# Patient Record
Sex: MALE | Race: WHITE | Employment: OTHER | URBAN - METROPOLITAN AREA
[De-identification: names, ages, dates, MRNs, and addresses within clinical notes are randomized per-mention and may not be internally consistent; named-entity substitution may affect disease eponyms.]

---

## 2017-02-09 ENCOUNTER — OFFICE VISIT (OUTPATIENT)
Dept: INTERNAL MEDICINE | Facility: CLINIC | Age: 42
End: 2017-02-09
Payer: COMMERCIAL

## 2017-02-09 VITALS
BODY MASS INDEX: 29.03 KG/M2 | HEART RATE: 89 BPM | OXYGEN SATURATION: 97 % | TEMPERATURE: 98.2 F | DIASTOLIC BLOOD PRESSURE: 96 MMHG | SYSTOLIC BLOOD PRESSURE: 136 MMHG | HEIGHT: 67 IN | WEIGHT: 185 LBS

## 2017-02-09 DIAGNOSIS — G93.31 POST VIRAL SYNDROME: Primary | ICD-10-CM

## 2017-02-09 DIAGNOSIS — R51.9 HEADACHE DUE TO VIRAL INFECTION: ICD-10-CM

## 2017-02-09 DIAGNOSIS — B34.9 HEADACHE DUE TO VIRAL INFECTION: ICD-10-CM

## 2017-02-09 PROCEDURE — 99214 OFFICE O/P EST MOD 30 MIN: CPT | Performed by: INTERNAL MEDICINE

## 2017-02-09 RX ORDER — PREDNISONE 20 MG/1
TABLET ORAL
Qty: 20 TABLET | Refills: 0 | Status: SHIPPED | OUTPATIENT
Start: 2017-02-09 | End: 2017-02-21

## 2017-02-09 NOTE — MR AVS SNAPSHOT
After Visit Summary   2/9/2017    Spencer Sahni    MRN: [id number]           Patient Information     Date Of Birth          1975        Visit Information        Provider Department      2/9/2017 11:00 AM Stanley Hinkle MD [facility]        Today's Diagnoses     Post viral syndrome    -  1     Headache due to viral infection           Care Instructions    *  Most likely post viral headaches.     *  Steroids to help reduce inflammation of the nerve and nerve tissues.     --Prednisone  3 tablets (60 mg) per day for 3 days, then 2 tablet (40 mg) per day for 3 days, 1 tablet (20 mg) per day for 3 days, then 1/2 tablet (10 mg) per day for 3 days, then stop.    *  Do not take any NSAIDS (Advil, Motrin, Aleve, Ibuprofen, or prescription versions of these medications) when taking prednisone    --Tylenol is OK.     *  Let us know if any changes in condition or any worsening.                Follow-ups after your visit        Who to contact     If you have questions or need follow up information about today's clinic visit or your schedule please contact [facility] directly at [phone].  Normal or non-critical lab and imaging results will be communicated to you by MyChart, letter or phone within 4 business days after the clinic has received the results. If you do not hear from us within 7 days, please contact the clinic through [employer] or phone. If you have a critical or abnormal lab result, we will notify you by phone as soon as possible.  Submit refill requests through [employer] or call your pharmacy and they will forward the refill request to us. Please allow 3 business days for your refill to be completed.          Additional Information About Your Visit        MyChart Information     [employer] lets you send messages to your doctor, view your test results, renew your prescriptions, schedule appointments and more. To sign up, go to  [email or web address] . Click on \"Log in\" on the left side of the screen, which will take you to the Welcome page. Then click on \"Sign up Now\" on the right side of the page.     You will be asked to enter the access code listed below, as well as some personal information. Please follow the directions to create your username and password.     Your access code is: [email or web address]  Expires: 2017 11:51 AM     Your access code will  in 90 days. If you need help or a new code, please call your [facility] clinic or [phone].        Care EveryWhere ID     This is your Care EveryWhere ID. This could be used by other organizations to access your [facility] medical records  [id number]        Your Vitals Were     Pulse Temperature Height BMI (Body Mass Index) Pulse Oximetry       89 98.2  F (36.8  C) (Oral) 5' 6.5\" (1.689 m) 29.42 kg/m2 97%        Blood Pressure from Last 3 Encounters:   17 136/96   16 130/78   16 118/88    Weight from Last 3 Encounters:   17 185 lb (83.915 kg)   16 173 lb (78.472 kg)   16 185 lb (83.915 kg)              Today, you had the following     No orders found for display         Today's Medication Changes          These changes are accurate as of: 17 12:12 PM.  If you have any questions, ask your nurse or doctor.               Start taking these medicines.        Dose/Directions    predniSONE 20 MG tablet   Commonly known as:  DELTASONE   Used for:  Post viral syndrome, Headache due to viral infection   Started by:  Stanley Hinkle MD        3 tablets per day for 3 days, then 2 tablet per day for 3 days, 1 tablet per day for 3 days, then 1/2 tablet per day for 3 days, then stop   Quantity:  20 tablet   Refills:  0            Where to get your medicines      These medications were sent to [place] [address] [place] [facility] [address] [facility]  [address] [facility] [address]     Phone:  [phone]    - predniSONE 20 MG tablet    "          Primary Care Provider Office Phone # Fax #    Chauncey Schmidt [phone]       [facility] [address] [place] [address]        Thank you!     Thank you for choosing [facility]  for your care. Our goal is always to provide you with excellent care. Hearing back from our patients is one way we can continue to improve our services. Please take a few minutes to complete the written survey that you may receive in the mail after your visit with us. Thank you!             Your Updated Medication List - Protect others around you: Learn how to safely use, store and throw away your medicines at www.disposemymeds.org.          This list is accurate as of: 2/9/17 12:12 PM.  Always use your most recent med list.                   Brand Name Dispense Instructions for use    omeprazole 20 MG CR capsule    priLOSEC    30 capsule    Take 1 capsule (20 mg) by mouth daily       predniSONE 20 MG tablet    DELTASONE    20 tablet    3 tablets per day for 3 days, then 2 tablet per day for 3 days, 1 tablet per day for 3 days, then 1/2 tablet per day for 3 days, then stop

## 2017-02-09 NOTE — PROGRESS NOTES
SUBJECTIVE:                                                    Spencer Sahni is a 41 year old male who presents to clinic today for the following health issues:    Headache     Onset: 9 days ago     Description:   Location: above L temple, under L eye and also in back of head and neck area L   Character: dull pain, constant 2-6/10  Frequency:  intermittent all day  Duration: 3-10 Minutes, but always there, mild    Intensity: mild, moderate    Progression of Symptoms:  intermittent    Accompanying Signs & Symptoms:  Stiff neck: YES  Neck or upper back pain: YES- in neck  Fever: 2 weeks ago today, cold/flu sx  Sinus pressure: YES- under L eye and in L ear  Nausea or vomiting: no  Dizziness: YES  Numbness: no  Weakness: no  Visual changes: no   History:   Head trauma: no  Family history of migraines: YES- pt does  Previous tests for headaches: no  Neurologist evaluations: no  Able to do daily activities: Yes  Wake with a headaches: YES - sometimes  Do headaches wake you up: YES- at times  Daily pain medication use: no  Work/school stressors/changes: no    Precipitating factors:   Does light make it worse: no  Does sound make it worse: no    Alleviating factors:  Does sleep help: YES- possibly         Therapies Tried and outcome: Ibuprofen (Advil, Motrin) and narcotics ( vicoden ) w/ little relief          Problem list and histories reviewed & adjusted, as indicated.  Additional history: as documented      Past Medical History:  ---------------------------  Past Medical History   Diagnosis Date     Anxiety      CARDIOVASCULAR SCREENING; LDL GOAL LESS THAN 160      Glaucoma        Past Surgical History:  ---------------------------  Past Surgical History   Procedure Laterality Date     Arthroscopy knee with meniscal repair       Right Knee       Current Medications:  ---------------------------  Current Outpatient Prescriptions   Medication Sig Dispense Refill     omeprazole (PRILOSEC) 20 MG capsule Take 1 capsule  "(20 mg) by mouth daily 30 capsule 11       Allergies:  -------------  Allergies   Allergen Reactions     No Known Drug Allergy        Social History:  -------------------  Social History     Social History     Marital Status: Single     Spouse Name: N/A     Number of Children: N/A     Years of Education: N/A     Occupational History     [employer] Self     [employer]     Social History Main Topics     Smoking status: Former Smoker -- 0.50 packs/day for 15 years     Quit date: 2010     Smokeless tobacco: Never Used     Alcohol Use: 16.8 - 25.2 oz/week     28-42 Standard drinks or equivalent per week      Comment: occ     Drug Use: No     Sexual Activity:     Partners: Female     Other Topics Concern     Parent/Sibling W/ Cabg, Mi Or Angioplasty Before 65f 55m? Yes     Social History Narrative       Family Medical History:  ------------------------------  Family History   Problem Relation Age of Onset     C.A.D. Father 43     MI age 43, +tob,  age 63     C.A.D. Paternal Grandmother      Bipolar Disorder Mother      Bipolar Disorder Maternal Uncle          ROS:  REVIEW OF SYSTEMS:    RESP: negative for cough, dyspnea, wheezing, hemoptysis  CV: negative for chest pain, palpitations, PND, MONAE, orthopnea; reports no changes in their ability to perform physical activity (from cardiovascular standpoint)  GI: negative for dysphagia, N/V, pain, melena, diarrhea and constipation  NEURO: negative for numbness/tingling, paralysis, incoordination, or focal weakness     OBJECTIVE:                                                    /96 mmHg  Pulse 89  Temp(Src) 98.2  F (36.8  C) (Oral)  Ht 5' 6.5\" (1.689 m)  Wt 185 lb (83.915 kg)  BMI 29.42 kg/m2  SpO2 97%     GENERAL alert and no distress.  EYES conjunctivae/corneas clear. PERRL, EOM's intact  HENT: NCAT,oral and posterior pharynx without lesions or erythema, facies symmetric  NECK: Neck supple. No LAD, without thyroidmegaly or JVD.  Normal " head and neck movements, negative brudzinskis sign, negative koenigs moves.   RESP: Clear to ausculation bilaterally without wheezes or crackles. Normal BS in all fields.  CV: RRR normal S1S2 without  murmurs, rubs or gallops. PMI normal  LYMPH: no cervical lymph adenopathy appreciated  GI: NTND, no organomegaly, normal BS in all quadrants, without rebound or guarding  MS: No cyanosis, clubbing or edema noted bilaterally in Upper and/or Lower Extremities  SKIN: no significant ulcers, lesions or rashes on the visualized portions of the skin  NEURO: Alert and Oriented x 3, Gait normal. Reflexes normal and symmetric. Sensation grossly WNL..  PSYCH: Alert and oriented times 3; speech- coherent , normal rate and volume; able to articulate logical thoughts, able to abstract reason, no tangential thoughts, no hallucinations or delusions, affect- normal          ASSESSMENT/PLAN:                                                      (G93.3) Post viral syndrome  (primary encounter diagnosis)  Comment:   Plan: predniSONE (DELTASONE) 20 MG tablet            (B34.9,  R51) Headache due to viral infection  Comment:   Plan: predniSONE (DELTASONE) 20 MG tablet            *  Most likely post viral headaches.     *  Steroids to help reduce inflammation of the nerve and nerve tissues.     --Prednisone  3 tablets (60 mg) per day for 3 days, then 2 tablet (40 mg) per day for 3 days, 1 tablet (20 mg) per day for 3 days, then 1/2 tablet (10 mg) per day for 3 days, then stop.    *  Do not take any NSAIDS (Advil, Motrin, Aleve, Ibuprofen, or prescription versions of these medications) when taking prednisone    --Tylenol is OK.     *  Let us know if any changes in condition or any worsening.                 See Patient Instructions    RAMIN JIMENEZ M.D., MD  [facility]

## 2017-02-09 NOTE — NURSING NOTE
"Chief Complaint   Patient presents with     Headache     X 9 days, intermittant       Initial /96 mmHg  Pulse 89  Temp(Src) 98.2  F (36.8  C) (Oral)  Ht 5' 6.5\" (1.689 m)  Wt 185 lb (83.915 kg)  BMI 29.42 kg/m2  SpO2 97% Estimated body mass index is 29.42 kg/(m^2) as calculated from the following:    Height as of this encounter: 5' 6.5\" (1.689 m).    Weight as of this encounter: 185 lb (83.915 kg).  Medication Reconciliation: complete   Umm Watosn CMA      "

## 2017-04-11 ENCOUNTER — TELEPHONE (OUTPATIENT)
Dept: INTERNAL MEDICINE | Facility: CLINIC | Age: 42
End: 2017-04-11

## 2017-04-11 NOTE — TELEPHONE ENCOUNTER
Prior authorization    Medication name omeprazole  Insurance medica  Insurance ID number [id number]  Prior authorization faxed through cover  meds

## 2017-07-07 ENCOUNTER — TRANSFERRED RECORDS (OUTPATIENT)
Dept: HEALTH INFORMATION MANAGEMENT | Facility: CLINIC | Age: 42
End: 2017-07-07

## 2017-07-20 ENCOUNTER — TRANSFERRED RECORDS (OUTPATIENT)
Dept: HEALTH INFORMATION MANAGEMENT | Facility: CLINIC | Age: 42
End: 2017-07-20

## 2017-11-01 DIAGNOSIS — K22.4 ESOPHAGEAL SPASM: ICD-10-CM

## 2017-11-01 DIAGNOSIS — K21.9 GASTROESOPHAGEAL REFLUX DISEASE, ESOPHAGITIS PRESENCE NOT SPECIFIED: ICD-10-CM

## 2018-02-22 DIAGNOSIS — K21.9 GASTROESOPHAGEAL REFLUX DISEASE, ESOPHAGITIS PRESENCE NOT SPECIFIED: ICD-10-CM

## 2018-02-22 DIAGNOSIS — K22.4 ESOPHAGEAL SPASM: ICD-10-CM

## 2018-02-22 NOTE — TELEPHONE ENCOUNTER
"Requested Prescriptions   Pending Prescriptions Disp Refills     omeprazole (PRILOSEC) 20 MG CR capsule [Pharmacy Med Name: OMEPRAZOLE 20MG CAPSULES]  Last Written Prescription Date:  11/01/2017  Last Fill Quantity: 30,  # refills: 3   Last office visit: 2/9/2017 with prescribing provider:  2/09/2017   Future Office Visit:     30 capsule 0     Sig: TAKE 1 CAPSULE BY MOUTH EVERY DAY    PPI Protocol Failed    2/22/2018  1:32 PM       Failed - Recent or future visit with authorizing provider's specialty    Patient had office visit in the last year or has a visit in the next 30 days with authorizing provider.  See \"Patient Info\" tab in inbasket, or \"Choose Columns\" in Meds & Orders section of the refill encounter.            Passed - Not on Clopidogrel (unless Pantoprazole ordered)       Passed - No diagnosis of osteoporosis on record       Passed - Patient is age 18 or older          "

## 2018-02-22 NOTE — TELEPHONE ENCOUNTER
Medication is being filled for 1 time refill only due to:  Patient needs to be seen because it has been more than one year since last visit.  Letter sent.

## 2018-02-22 NOTE — LETTER
[facility]  [address] [id number] [phone]            Spencer Sahni  [address]  [facility] [address]        February 22, 2018    Dear Spencer,    While refilling your prescription today, we noticed that you are due for an appointment with your provider.  We will refill your prescription for 30 days, but a follow-up appointment must be made before any additional refills can be approved.     Taking care of your health is important to us and we look forward to seeing you in the near future.  Please call us at [phone] or [phone] (or use [employer]) to schedule an appointment.     Please disregard this notice if you have already made an appointment.    Sincerely,        [facility]

## 2018-04-24 DIAGNOSIS — K22.4 ESOPHAGEAL SPASM: ICD-10-CM

## 2018-04-24 DIAGNOSIS — K21.9 GASTROESOPHAGEAL REFLUX DISEASE, ESOPHAGITIS PRESENCE NOT SPECIFIED: ICD-10-CM

## 2018-04-25 NOTE — TELEPHONE ENCOUNTER
"Requested Prescriptions   Pending Prescriptions Disp Refills     omeprazole (PRILOSEC) 20 MG CR capsule [Pharmacy Med Name: OMEPRAZOLE 20MG CAPSULES] 30 capsule 0     Sig: TAKE ONE CAPSULE BY MOUTH DAILY    PPI Protocol Failed    4/24/2018  3:39 PM       Failed - Recent (12 mo) or future (30 days) visit within the authorizing provider's specialty    Patient had office visit in the last 12 months or has a visit in the next 30 days with authorizing provider or within the authorizing provider's specialty.  See \"Patient Info\" tab in inbasket, or \"Choose Columns\" in Meds & Orders section of the refill encounter.           Passed - Not on Clopidogrel (unless Pantoprazole ordered)       Passed - No diagnosis of osteoporosis on record       Passed - Patient is age 18 or older        Routing refill request to provider for review/approval because:  María Elena given x1 and patient did not follow up, please advise  Patient needs to be seen because it has been more than 1 year since last office visit.        "

## 2018-05-24 DIAGNOSIS — K22.4 ESOPHAGEAL SPASM: ICD-10-CM

## 2018-05-24 DIAGNOSIS — K21.9 GASTROESOPHAGEAL REFLUX DISEASE, ESOPHAGITIS PRESENCE NOT SPECIFIED: ICD-10-CM

## 2018-05-24 NOTE — TELEPHONE ENCOUNTER
"Requested Prescriptions   Pending Prescriptions Disp Refills     omeprazole (PRILOSEC) 20 MG CR capsule [Pharmacy Med Name: OMEPRAZOLE 20MG CAPSULES] 30 capsule 0     Sig: TAKE 1 CAPSULE BY MOUTH DAILY    PPI Protocol Failed    5/24/2018  9:40 AM       Failed - Recent (12 mo) or future (30 days) visit within the authorizing provider's specialty    Patient had office visit in the last 12 months or has a visit in the next 30 days with authorizing provider or within the authorizing provider's specialty.  See \"Patient Info\" tab in inbasket, or \"Choose Columns\" in Meds & Orders section of the refill encounter.           Passed - Not on Clopidogrel (unless Pantoprazole ordered)       Passed - No diagnosis of osteoporosis on record       Passed - Patient is age 18 or older        Routing refill request to provider for review/approval because:  María Elena given x1 and patient did not follow up, please advise  Patient needs to be seen because it has been more than 1 year since last office visit.        "

## 2018-05-25 NOTE — TELEPHONE ENCOUNTER
Called and left message with [facility] with information below.  Sierra Anders, SHARON on 5/25/2018 at 8:25 AM

## 2018-05-25 NOTE — TELEPHONE ENCOUNTER
Pt last seen early 2017 by Manan Bonilla thru [facility] - but he hasn't seen him since 2016  I'm listed as PCP but I haven't seen him since 2015.  Has seen GI  Mid 2017- more recently than any of the above.    Would rec'd RF thru them or f/u here. Until then can get this OTC.